# Patient Record
Sex: FEMALE | Race: AMERICAN INDIAN OR ALASKA NATIVE
[De-identification: names, ages, dates, MRNs, and addresses within clinical notes are randomized per-mention and may not be internally consistent; named-entity substitution may affect disease eponyms.]

---

## 2017-08-07 ENCOUNTER — HOSPITAL ENCOUNTER (EMERGENCY)
Dept: HOSPITAL 5 - ED | Age: 57
Discharge: HOME | End: 2017-08-07
Payer: COMMERCIAL

## 2017-08-07 VITALS — DIASTOLIC BLOOD PRESSURE: 82 MMHG | SYSTOLIC BLOOD PRESSURE: 124 MMHG

## 2017-08-07 DIAGNOSIS — M54.12: Primary | ICD-10-CM

## 2017-08-07 DIAGNOSIS — F17.200: ICD-10-CM

## 2017-08-07 DIAGNOSIS — Z88.0: ICD-10-CM

## 2017-08-07 DIAGNOSIS — M47.812: ICD-10-CM

## 2017-08-07 LAB
ANION GAP SERPL CALC-SCNC: 16 MMOL/L
BASOPHILS NFR BLD AUTO: 0.5 % (ref 0–1.8)
BUN SERPL-MCNC: 14 MG/DL (ref 7–17)
BUN/CREAT SERPL: 15.55 %
CALCIUM SERPL-MCNC: 9 MG/DL (ref 8.4–10.2)
CHLORIDE SERPL-SCNC: 102 MMOL/L (ref 98–107)
CK SERPL-CCNC: 92 UNITS/L (ref 30–135)
CO2 SERPL-SCNC: 27 MMOL/L (ref 22–30)
EOSINOPHIL NFR BLD AUTO: 2.1 % (ref 0–4.3)
GLUCOSE SERPL-MCNC: 99 MG/DL (ref 65–100)
HCT VFR BLD CALC: 41.3 % (ref 30.3–42.9)
HGB BLD-MCNC: 14.1 GM/DL (ref 10.1–14.3)
MCH RBC QN AUTO: 32 PG (ref 28–32)
MCHC RBC AUTO-ENTMCNC: 34 % (ref 30–34)
MCV RBC AUTO: 94 FL (ref 79–97)
PLATELET # BLD: 223 K/MM3 (ref 140–440)
POTASSIUM SERPL-SCNC: 4.2 MMOL/L (ref 3.6–5)
RBC # BLD AUTO: 4.39 M/MM3 (ref 3.65–5.03)
SODIUM SERPL-SCNC: 141 MMOL/L (ref 137–145)
WBC # BLD AUTO: 7.5 K/MM3 (ref 4.5–11)

## 2017-08-07 PROCEDURE — 93010 ELECTROCARDIOGRAM REPORT: CPT

## 2017-08-07 PROCEDURE — 71020: CPT

## 2017-08-07 PROCEDURE — 80048 BASIC METABOLIC PNL TOTAL CA: CPT

## 2017-08-07 PROCEDURE — 84484 ASSAY OF TROPONIN QUANT: CPT

## 2017-08-07 PROCEDURE — 72040 X-RAY EXAM NECK SPINE 2-3 VW: CPT

## 2017-08-07 PROCEDURE — 85025 COMPLETE CBC W/AUTO DIFF WBC: CPT

## 2017-08-07 PROCEDURE — 82550 ASSAY OF CK (CPK): CPT

## 2017-08-07 PROCEDURE — 82553 CREATINE MB FRACTION: CPT

## 2017-08-07 PROCEDURE — 36415 COLL VENOUS BLD VENIPUNCTURE: CPT

## 2017-08-07 PROCEDURE — 93005 ELECTROCARDIOGRAM TRACING: CPT

## 2017-08-07 NOTE — EMERGENCY DEPARTMENT REPORT
ED Upper Extremity Inj HPI





- General


Chief Complaint: Extremity Injury, Upper


Stated Complaint: LEFT ARM PAIN


Time Seen by Provider: 08/07/17 07:49


Source: patient


Mode of arrival: Ambulatory


Limitations: No Limitations





- History of Present Illness


Initial Comments: 





This is a 57-year-old female nontoxic, well nourished in appearance, no acute 

signs of distress presents to ED complaining of left arm pain with tickling 

sensation 1 day.  Patient also stated she had a chest pain episode yesterday 

but denies any chest pain today.  Patient describes chest pain as sharp 

sensation but denies any radiation.  Today patient woke up with left arm pain 

and tingling.  Patient also states she has upper back pain and pain radiates 

from back to her left arm.  Patient denies any jaw pain, chest pain, shortness 

of breath, nausea, vomiting, fever, chills, stiff neck, numbness, or decreased 

range of motion.  Patient denies any trauma to extremity.  Patient describes 

left arm pain as aching with level of 3 out of 10.  Allergies to penicillin.  

Denies past medical history.


MD Complaint: Injury to:: left, arm


-: Gradual, days(s) (1)


Other Extremity Injury: Arm: Left


Other Injuries: none


Handedness: right


Improves With: none


Worsens With: none


Associated Symptoms: denies other symptoms, neck pain.  denies: weakness, 

numbness, suspects foreign body, nausea/vomiting, heard/felt popping sensat





- Related Data


 Previous Rx's











 Medication  Instructions  Recorded  Last Taken  Type


 


Acetaminophen/Codeine [Tylenol #3] 1 tab PO TID PRN #15 tab 08/30/15 Unknown Rx


 


methOCARBAMOL [Robaxin TAB] 500 mg PO BID #20 tab 08/30/15 Unknown Rx


 


Diazepam Tab [Valium] 5 mg PO Q8H PRN #21 tablet 09/02/15 Unknown Rx


 


HYDROcodone/APAP 5-325 [[place] 1 each PO Q6HR PRN #20 tablet 09/02/15 Unknown Rx





5/325]    


 


Ibuprofen [Motrin 600 MG tab] 600 mg PO Q8H PRN #30 tablet 08/07/17 Unknown Rx


 


predniSONE [Deltasone] 20 mg PO BID #10 tab 08/07/17 Unknown Rx











 Allergies











Allergy/AdvReac Type Severity Reaction Status Date / Time


 


Penicillins Allergy  Hives Verified 08/30/15 14:41














ED Review of Systems


ROS: 


Stated complaint: LEFT ARM PAIN


Other details as noted in HPI





Constitutional: denies: chills, fever


Eyes: denies: eye pain, eye discharge, vision change


ENT: denies: ear pain, throat pain


Respiratory: denies: cough, shortness of breath, wheezing


Cardiovascular: denies: chest pain, palpitations


Endocrine: no symptoms reported


Gastrointestinal: denies: abdominal pain, nausea, diarrhea


Genitourinary: denies: urgency, dysuria, discharge


Musculoskeletal: denies: back pain, joint swelling, arthralgia


Skin: denies: rash, lesions


Neurological: denies: headache, weakness, paresthesias


Psychiatric: denies: anxiety, depression


Hematological/Lymphatic: denies: easy bleeding, easy bruising





ED Past Medical Hx





- Past Medical History


Previous Medical History?: No





- Surgical History


Past Surgical History?: Yes


Additional Surgical History: MISCARRIAGE d&c, left hand





- Social History


Smoking Status: Current Every Day Smoker


Substance Use Type: Alcohol





- Medications


Home Medications: 


 Home Medications











 Medication  Instructions  Recorded  Confirmed  Last Taken  Type


 


Acetaminophen/Codeine [Tylenol #3] 1 tab PO TID PRN #15 tab 08/30/15  Unknown Rx


 


methOCARBAMOL [Robaxin TAB] 500 mg PO BID #20 tab 08/30/15  Unknown Rx


 


Diazepam Tab [Valium] 5 mg PO Q8H PRN #21 tablet 09/02/15  Unknown Rx


 


HYDROcodone/APAP 5-325 [[place] 1 each PO Q6HR PRN #20 tablet 09/02/15  Unknown Rx





5/325]     


 


Ibuprofen [Motrin 600 MG tab] 600 mg PO Q8H PRN #30 tablet 08/07/17  Unknown Rx


 


predniSONE [Deltasone] 20 mg PO BID #10 tab 08/07/17  Unknown Rx














ED Physical Exam





- General


Limitations: No Limitations


General appearance: alert, in no apparent distress





- Head


Head exam: Present: atraumatic, normocephalic, normal inspection





- Eye


Eye exam: Present: normal appearance, PERRL, EOMI.  Absent: scleral icterus, 

conjunctival injection, nystagmus, periorbital swelling, periorbital tenderness


Pupils: Present: normal accommodation





- ENT


ENT exam: Present: normal exam, normal orophraynx, mucous membranes moist, TM's 

normal bilaterally, normal external ear exam





- Neck


Neck exam: Present: normal inspection, full ROM.  Absent: tenderness, 

meningismus, lymphadenopathy, thyromegaly





- Respiratory


Respiratory exam: Present: normal lung sounds bilaterally.  Absent: respiratory 

distress, rales, rhonchi, stridor, chest wall tenderness, accessory muscle use, 

decreased breath sounds, prolonged expiratory





- Cardiovascular


Cardiovascular Exam: Present: regular rate, normal rhythm, normal heart sounds.

  Absent: bradycardia, tachycardia, irregular rhythm, systolic murmur, 

diastolic murmur, rubs, gallop





- GI/Abdominal


GI/Abdominal exam: Present: soft, normal bowel sounds.  Absent: distended, 

tenderness, guarding, rebound, rigid, diminished bowel sounds





- Rectal


Rectal exam: Present: deferred





- Extremities Exam


Extremities exam: Present: normal inspection, full ROM, normal capillary 

refill.  Absent: tenderness, pedal edema, joint swelling, calf tenderness





- Expanded Upper Extremity Exam


  ** Left


General: Present: normal inspection


Shoulder Exam: Present: normal inspection, full ROM.  Absent: tenderness, 

swelling, abrasion, laceration, ecchymosis, deformity, crepidus, dislocation, 

erythema, tenderness over AC joint


Upper Arm exam: Present: normal inspection, full ROM.  Absent: tenderness, 

swelling, abrasion, laceration, ecchymosis, deformity, crepidus, dislocation, 

erythema


Elbow exam: Present: normal inspection, full ROM.  Absent: tenderness, swelling

, abrasion, laceration, ecchymosis, deformity, crepidus, dislocation, erythema, 

effusion, pain w/ pronation/supination, tenderness over radial head


Forearm Wrist exam: Present: normal inspection, full ROM.  Absent: tenderness, 

swelling, abrasion, laceration, ecchymosis, deformity, crepidus, dislocation, 

erythema, tenderness over anatomical snuff box, pain with axial thumb loading


Hand Wrist exam: Present: normal inspection, full ROM.  Absent: tenderness, 

swelling, abrasion, laceration, ecchymosis, deformity, crepidus, dislocation, 

erythema, amputation, nail avulsion, subungual hematoma


Neuro motor exam: Present: wrist extension intact, thumb opposition intact, 

thumb IP flexion intact, thumb adduction intact, fingers 2-5 abduction intact


Neurosensory exam: Present: 2-point discrimination, radial nerve intact, ulnar 

nerve intact, median nerve intact


Vascular: Present: vascular compromise, normal capillary refill, radial pulse, 

brachial pulse, ulnar pulse





- Back Exam


Back exam: Present: normal inspection, full ROM.  Absent: tenderness, CVA 

tenderness (R), CVA tenderness (L), muscle spasm, paraspinal tenderness, 

vertebral tenderness, rash noted





- Neurological Exam


Neurological exam: Present: alert, oriented X3, CN II-XII intact, normal gait, 

reflexes normal





- Psychiatric


Psychiatric exam: Present: normal affect, normal mood





- Skin


Skin exam: Present: warm, dry, intact, normal color.  Absent: rash





- Other


Other exam information: 





Nonreproducible chest pain.





ED Course


 Vital Signs











  08/07/17





  07:27


 


Temperature 97.4 F L


 


Pulse Rate 67


 


Respiratory 18





Rate 


 


Blood Pressure 124/82


 


O2 Sat by Pulse 97





Oximetry 














- Reevaluation(s)


Reevaluation #1: 





08/07/17 08:21


Patient is speaking in full sentences with no signs of distress noted.





ED Medical Decision Making





- Medical Decision Making





ED course; as is a 57-year-old female that presents with cervical radiculopathy/

spondylosis





1- patient was examined myself.  CBC, BMP, troponin, cardiac CK, EKG, chest x-

ray and cervical lumbar x-ray has been obtained.  All normal findings.  Patient 

was instructed of results.  Cervical x-ray has been dictated by radiologist and 

impression of cervical spondylosis.


2- ANNE score; 0 point.  0.8% risk of all cases mortality at 30 days.


3- patient was treated for cervical radiculopathy with ibuprofen and prednisone.


4- patient was instructed to follow-up with her primary care doctor/orthopedic 

doctor in 3-5 days or if symptoms such as chest pain, shortness of breath, 

nausea, vomiting, fever, chills, headache return to emergency room as soon as 

possible.


5- At time time of discharge, the patient does not seem toxic or ill in 

appearance.  No acute signs of distress noted.  Patient agrees to discharge 

treatment plan of care.  No further questions noted by the patient.


Critical care attestation.: 


If time is entered above; I have spent that time in minutes in the direct care 

of this critically ill patient, excluding procedure time.








ED Disposition


Clinical Impression: 


 Cervical radiculopathy





Cervical spondylosis


Qualifiers:


 Spinal osteoarthritis complication: unspecified spinal osteoarthritis 

Qualified Code(s): M47.812 - Spondylosis without myelopathy or radiculopathy, 

cervical region





Disposition: DC-01 TO HOME OR SELFCARE


Is pt being admited?: No


Does the pt Need Aspirin: No


Condition: Stable


Instructions:  Ibuprofen (By mouth), Prednisone (By mouth), Cervical Spinal 

Stenosis (ED), Cervical Radiculopathy (ED)


Additional Instructions: 


follow-up with your primary care doctor/orthopedic doctor in 3-5 days or if 

symptoms such as chest pain, shortness of breath, nausea, vomiting, fever, 

chills, headache return to emergency room as soon as possible.


Take ibuprofen and prednisone as prescribed.


Prescriptions: 


Ibuprofen [Motrin 600 MG tab] 600 mg PO Q8H PRN #30 tablet


 PRN Reason: Pain


predniSONE [Deltasone] 20 mg PO BID #10 tab


Referrals: 


PRIMARY CARE,MD [Primary Care Provider] - 3-5 Days


STEFANY HARRISON MD [Staff Physician] - 3-5 Days


[facility] [Outside] - 3-5 Days


[facility] [Outside] - 3-5 Days


BARRETT BURDEN MD [Staff Physician] - 3-5 Days


Forms:  Work/School Release Form(ED)

## 2017-08-07 NOTE — XRAY REPORT
Cervical spine 3 views:



History: Back pain.



Findings:



Normal height of vertebral bodies and intervertebral disc. Sclerotic 

articular surfaces with peripheral osteophyte at C4, C5 and C6. No 

acute fracture. Normal prevertebral soft tissue.



Impression:



Cervical spondylosis.

## 2017-08-07 NOTE — XRAY REPORT
Chest 2 views:



History: Chest pain.



Findings



Borderline cardiomegaly. Trachea is midline. No consolidation, 

pneumothorax or pleural effusion.



Impression:



No definite acute cardiopulmonary findings

## 2020-04-05 ENCOUNTER — HOSPITAL ENCOUNTER (EMERGENCY)
Dept: HOSPITAL 5 - ED | Age: 60
LOS: 1 days | Discharge: LEFT BEFORE DISCHARGE | End: 2020-04-06
Payer: SELF-PAY

## 2020-04-05 VITALS — DIASTOLIC BLOOD PRESSURE: 99 MMHG | SYSTOLIC BLOOD PRESSURE: 143 MMHG

## 2020-04-05 DIAGNOSIS — Z53.21: ICD-10-CM

## 2020-04-05 DIAGNOSIS — M54.6: Primary | ICD-10-CM

## 2020-04-05 LAB
BASOPHILS # (AUTO): 0 K/MM3 (ref 0–0.1)
BASOPHILS NFR BLD AUTO: 0.5 % (ref 0–1.8)
EOSINOPHIL # BLD AUTO: 0.1 K/MM3 (ref 0–0.4)
EOSINOPHIL NFR BLD AUTO: 1.4 % (ref 0–4.3)
HCT VFR BLD CALC: 40.5 % (ref 30.3–42.9)
HGB BLD-MCNC: 13.8 GM/DL (ref 10.1–14.3)
LYMPHOCYTES # BLD AUTO: 3.3 K/MM3 (ref 1.2–5.4)
LYMPHOCYTES NFR BLD AUTO: 33.4 % (ref 13.4–35)
MCHC RBC AUTO-ENTMCNC: 34 % (ref 30–34)
MCV RBC AUTO: 90 FL (ref 79–97)
MONOCYTES # (AUTO): 0.6 K/MM3 (ref 0–0.8)
MONOCYTES % (AUTO): 6.3 % (ref 0–7.3)
PLATELET # BLD: 215 K/MM3 (ref 140–440)
RBC # BLD AUTO: 4.49 M/MM3 (ref 3.65–5.03)

## 2020-04-05 PROCEDURE — 85025 COMPLETE CBC W/AUTO DIFF WBC: CPT

## 2020-04-05 PROCEDURE — 81001 URINALYSIS AUTO W/SCOPE: CPT

## 2020-04-05 PROCEDURE — 83735 ASSAY OF MAGNESIUM: CPT

## 2020-04-05 PROCEDURE — 83690 ASSAY OF LIPASE: CPT

## 2020-04-05 PROCEDURE — 80053 COMPREHEN METABOLIC PANEL: CPT

## 2020-04-05 PROCEDURE — 36415 COLL VENOUS BLD VENIPUNCTURE: CPT

## 2020-04-05 PROCEDURE — 82550 ASSAY OF CK (CPK): CPT

## 2020-04-06 LAB
ALBUMIN SERPL-MCNC: 3.9 G/DL (ref 3.9–5)
ALT SERPL-CCNC: 24 UNITS/L (ref 7–56)
BACTERIA #/AREA URNS HPF: (no result) /HPF
BILIRUB UR QL STRIP: (no result)
BLOOD UR QL VISUAL: (no result)
BUN SERPL-MCNC: 13 MG/DL (ref 7–17)
BUN/CREAT SERPL: 19 %
CALCIUM SERPL-MCNC: 9.1 MG/DL (ref 8.4–10.2)
HEMOLYSIS INDEX: 9
MUCOUS THREADS #/AREA URNS HPF: (no result) /HPF
PH UR STRIP: 6 [PH] (ref 5–7)
PROT UR STRIP-MCNC: (no result) MG/DL
RBC #/AREA URNS HPF: 2 /HPF (ref 0–6)
UROBILINOGEN UR-MCNC: < 2 MG/DL (ref ?–2)
WBC #/AREA URNS HPF: 1 /HPF (ref 0–6)